# Patient Record
Sex: FEMALE | Race: BLACK OR AFRICAN AMERICAN | NOT HISPANIC OR LATINO | URBAN - METROPOLITAN AREA
[De-identification: names, ages, dates, MRNs, and addresses within clinical notes are randomized per-mention and may not be internally consistent; named-entity substitution may affect disease eponyms.]

---

## 2019-02-17 ENCOUNTER — EMERGENCY (EMERGENCY)
Facility: HOSPITAL | Age: 44
LOS: 1 days | Discharge: HOME | End: 2019-02-17
Attending: EMERGENCY MEDICINE
Payer: COMMERCIAL

## 2019-02-17 VITALS
DIASTOLIC BLOOD PRESSURE: 70 MMHG | OXYGEN SATURATION: 99 % | SYSTOLIC BLOOD PRESSURE: 118 MMHG | RESPIRATION RATE: 18 BRPM | TEMPERATURE: 99 F | HEART RATE: 70 BPM

## 2019-02-17 VITALS
OXYGEN SATURATION: 100 % | WEIGHT: 199.96 LBS | TEMPERATURE: 98 F | RESPIRATION RATE: 18 BRPM | HEART RATE: 53 BPM | HEIGHT: 64 IN | DIASTOLIC BLOOD PRESSURE: 73 MMHG | SYSTOLIC BLOOD PRESSURE: 116 MMHG

## 2019-02-17 DIAGNOSIS — R10.9 UNSPECIFIED ABDOMINAL PAIN: ICD-10-CM

## 2019-02-17 LAB
ALBUMIN SERPL ELPH-MCNC: 3.9 G/DL — SIGNIFICANT CHANGE UP (ref 3.3–5)
ALP SERPL-CCNC: 40 U/L — SIGNIFICANT CHANGE UP (ref 40–120)
ALT FLD-CCNC: 16 U/L — SIGNIFICANT CHANGE UP (ref 10–45)
ANION GAP SERPL CALC-SCNC: 12 MMOL/L — SIGNIFICANT CHANGE UP (ref 5–17)
APPEARANCE UR: CLEAR — SIGNIFICANT CHANGE UP
AST SERPL-CCNC: 14 U/L — SIGNIFICANT CHANGE UP (ref 10–40)
BASOPHILS # BLD AUTO: 0 K/UL — SIGNIFICANT CHANGE UP (ref 0–0.2)
BASOPHILS NFR BLD AUTO: 0.1 % — SIGNIFICANT CHANGE UP (ref 0–2)
BILIRUB SERPL-MCNC: 0.2 MG/DL — SIGNIFICANT CHANGE UP (ref 0.2–1.2)
BILIRUB UR-MCNC: NEGATIVE — SIGNIFICANT CHANGE UP
BUN SERPL-MCNC: 15 MG/DL — SIGNIFICANT CHANGE UP (ref 7–23)
CALCIUM SERPL-MCNC: 9.5 MG/DL — SIGNIFICANT CHANGE UP (ref 8.4–10.5)
CHLORIDE SERPL-SCNC: 105 MMOL/L — SIGNIFICANT CHANGE UP (ref 96–108)
CO2 SERPL-SCNC: 22 MMOL/L — SIGNIFICANT CHANGE UP (ref 22–31)
COLOR SPEC: SIGNIFICANT CHANGE UP
CREAT SERPL-MCNC: 1.03 MG/DL — SIGNIFICANT CHANGE UP (ref 0.5–1.3)
DIFF PNL FLD: NEGATIVE — SIGNIFICANT CHANGE UP
EOSINOPHIL # BLD AUTO: 0.2 K/UL — SIGNIFICANT CHANGE UP (ref 0–0.5)
EOSINOPHIL NFR BLD AUTO: 3.6 % — SIGNIFICANT CHANGE UP (ref 0–6)
GLUCOSE SERPL-MCNC: 109 MG/DL — HIGH (ref 70–99)
GLUCOSE UR QL: NEGATIVE — SIGNIFICANT CHANGE UP
HCT VFR BLD CALC: 36.5 % — SIGNIFICANT CHANGE UP (ref 34.5–45)
HGB BLD-MCNC: 13.1 G/DL — SIGNIFICANT CHANGE UP (ref 11.5–15.5)
KETONES UR-MCNC: NEGATIVE — SIGNIFICANT CHANGE UP
LEUKOCYTE ESTERASE UR-ACNC: NEGATIVE — SIGNIFICANT CHANGE UP
LIDOCAIN IGE QN: 34 U/L — SIGNIFICANT CHANGE UP (ref 7–60)
LYMPHOCYTES # BLD AUTO: 0.7 K/UL — LOW (ref 1–3.3)
LYMPHOCYTES # BLD AUTO: 14.2 % — SIGNIFICANT CHANGE UP (ref 13–44)
MCHC RBC-ENTMCNC: 30.3 PG — SIGNIFICANT CHANGE UP (ref 27–34)
MCHC RBC-ENTMCNC: 36 GM/DL — SIGNIFICANT CHANGE UP (ref 32–36)
MCV RBC AUTO: 84.3 FL — SIGNIFICANT CHANGE UP (ref 80–100)
MONOCYTES # BLD AUTO: 0.3 K/UL — SIGNIFICANT CHANGE UP (ref 0–0.9)
MONOCYTES NFR BLD AUTO: 6.8 % — SIGNIFICANT CHANGE UP (ref 2–14)
NEUTROPHILS # BLD AUTO: 3.9 K/UL — SIGNIFICANT CHANGE UP (ref 1.8–7.4)
NEUTROPHILS NFR BLD AUTO: 75.3 % — SIGNIFICANT CHANGE UP (ref 43–77)
NITRITE UR-MCNC: NEGATIVE — SIGNIFICANT CHANGE UP
PH UR: 5.5 — SIGNIFICANT CHANGE UP (ref 5–8)
PLATELET # BLD AUTO: 261 K/UL — SIGNIFICANT CHANGE UP (ref 150–400)
POTASSIUM SERPL-MCNC: 4.5 MMOL/L — SIGNIFICANT CHANGE UP (ref 3.5–5.3)
POTASSIUM SERPL-SCNC: 4.5 MMOL/L — SIGNIFICANT CHANGE UP (ref 3.5–5.3)
PROT SERPL-MCNC: 7.7 G/DL — SIGNIFICANT CHANGE UP (ref 6–8.3)
PROT UR-MCNC: NEGATIVE — SIGNIFICANT CHANGE UP
RBC # BLD: 4.33 M/UL — SIGNIFICANT CHANGE UP (ref 3.8–5.2)
RBC # FLD: 13.3 % — SIGNIFICANT CHANGE UP (ref 10.3–14.5)
SODIUM SERPL-SCNC: 139 MMOL/L — SIGNIFICANT CHANGE UP (ref 135–145)
SP GR SPEC: 1.01 — SIGNIFICANT CHANGE UP (ref 1.01–1.02)
UROBILINOGEN FLD QL: NEGATIVE — SIGNIFICANT CHANGE UP
WBC # BLD: 5.1 K/UL — SIGNIFICANT CHANGE UP (ref 3.8–10.5)
WBC # FLD AUTO: 5.1 K/UL — SIGNIFICANT CHANGE UP (ref 3.8–10.5)

## 2019-02-17 PROCEDURE — 80053 COMPREHEN METABOLIC PANEL: CPT

## 2019-02-17 PROCEDURE — 76830 TRANSVAGINAL US NON-OB: CPT

## 2019-02-17 PROCEDURE — 99284 EMERGENCY DEPT VISIT MOD MDM: CPT | Mod: 25

## 2019-02-17 PROCEDURE — 93975 VASCULAR STUDY: CPT | Mod: 26

## 2019-02-17 PROCEDURE — 76830 TRANSVAGINAL US NON-OB: CPT | Mod: 26

## 2019-02-17 PROCEDURE — 93971 EXTREMITY STUDY: CPT

## 2019-02-17 PROCEDURE — 83690 ASSAY OF LIPASE: CPT

## 2019-02-17 PROCEDURE — 93975 VASCULAR STUDY: CPT

## 2019-02-17 PROCEDURE — 85027 COMPLETE CBC AUTOMATED: CPT

## 2019-02-17 PROCEDURE — 93926 LOWER EXTREMITY STUDY: CPT

## 2019-02-17 PROCEDURE — 81003 URINALYSIS AUTO W/O SCOPE: CPT

## 2019-02-17 PROCEDURE — 93971 EXTREMITY STUDY: CPT | Mod: 26

## 2019-02-17 PROCEDURE — 87086 URINE CULTURE/COLONY COUNT: CPT

## 2019-02-17 RX ORDER — ACETAMINOPHEN 500 MG
650 TABLET ORAL ONCE
Qty: 0 | Refills: 0 | Status: COMPLETED | OUTPATIENT
Start: 2019-02-17 | End: 2019-02-17

## 2019-02-17 RX ADMIN — Medication 650 MILLIGRAM(S): at 12:06

## 2019-02-17 RX ADMIN — Medication 650 MILLIGRAM(S): at 10:12

## 2019-02-17 NOTE — CONSULT NOTE ADULT - ATTENDING COMMENTS
Pt seen and ultrasound discussed. Pt feeling better now after tylenol. No evidence of torsion. Pt advised to f/u with her outpatient gynecologist for a repeat ultrasound in two to three months.     MELANIE Cordova

## 2019-02-17 NOTE — ED PROVIDER NOTE - NS ED ROS FT
ROS:  GENERAL: No fever, no chills  EYES: no change in vision  HEENT: no trouble swallowing, no trouble speaking  CARDIAC: no chest pain  PULMONARY: no cough, no shortness of breath  GI: suprapubic abdominal discomfort, no nausea, no vomiting, no diarrhea, no constipation  : No dysuria, no frequency, no change in appearance, or odor of urine  SKIN: no rashes  NEURO: no headache, no weakness  MSK: RLE inner thigh pain  ~Jesus Stern D.O. -Resident

## 2019-02-17 NOTE — CONSULT NOTE ADULT - ASSESSMENT
43F with LMP 1/21/19 presented with RLQ pelvic pain now resolved, with fibroid uterus and physiologic ovarian cyst.

## 2019-02-17 NOTE — ED ADULT TRIAGE NOTE - CHIEF COMPLAINT QUOTE
sudden onset of lower abdominal cramping radiating to right leg associated with nausea, LMP Jan 21, started birth control pills Jan 28

## 2019-02-17 NOTE — ED PROVIDER NOTE - ATTENDING CONTRIBUTION TO CARE
attending Gissel: 43yF presents after episode of pelvic pain. Sharp R sided pain since resolved. Denies h/o ovarian cysts. No vaginal discharge or bleeding. No urinary symptoms. New OCP started last month. LMP 1/21. Also with transient episode of RLE pain. Will obtain labs including HCG, UA, TVUS, RLE duplex, pain control and reassess

## 2019-02-17 NOTE — ED PROVIDER NOTE - OBJECTIVE STATEMENT
43f no pmh pw pelvic pain since 2am this morning. patient reports having sharp pelvic pain radiating to the rle. patient denies having vaginal discharge, bleeding, dysuria. Reports having change in birth control last month but denies chest pain, shortness of breath, dizziness, palpitations, recent travel, hx of clots, smoking. lmp 1/21. no leg swelling rashes weakness in rle 43f no pmh pw pelvic pain since 2am this morning. patient reports having sharp pelvic pain radiating to the rle. patient denies having vaginal discharge, bleeding, dysuria. Reports having change in birth control last month but denies chest pain, shortness of breath, dizziness, palpitations, recent travel, hx of clots, smoking. lmp 1/21. no leg swelling rashes weakness in rle    obgyn- Dr. Jacques Moritz Holland

## 2019-02-17 NOTE — ED PROVIDER NOTE - PROGRESS NOTE DETAILS
attending Gissel: results reviewed with patient. No additional episodes of RLQ pain. Offered GYN consult in ED for possible torsion. pt unsure if she wants to stay- will discuss with her family and let ED team know. updated patient with results, would like a gyn consult eval for ovarian cyst attending Gissel: GYN at bedside attending Gissel: evaluated by OB. No pain in ED. Deemed stable for dc. Will dc with copy of results, instruction for close outpatient GYN followup. Strict return precautions discussed at length.

## 2019-02-17 NOTE — CONSULT NOTE ADULT - SUBJECTIVE AND OBJECTIVE BOX
HPI: 43F with LMP 19 presented with RLQ pelvic pain that started at 2am this morning. patient reports having sharp pelvic pain radiating to the right leg. pain is now resolved. pt took oral tylenol. patient denies having vaginal discharge, bleeding, dysuria. Reports having started a birth control last month with spironolactone as treatment for alopecia. She has a known ovarian cyst on the right - told it was small in past. She denies chest pain, shortness of breath, dizziness, palpitations, recent travel, hx of clots, smoking. no leg swelling rashes weakness in lower extremities.    obgyn- Dr. Jacques Moritz Cornel      PAST MEDICAL & SURGICAL HISTORY:  No pertinent past medical history  No significant past surgical history      REVIEW OF SYSTEMS  negative        Allergies    No Known Allergies    Intolerances            Vital Signs Last 24 Hrs  T(C): 36.4 (2019 07:14), Max: 36.4 (2019 07:14)  T(F): 97.6 (2019 07:14), Max: 97.6 (2019 07:14)  HR: 62 (2019 10:15) (53 - 62)  BP: 121/70 (2019 10:15) (116/73 - 122/68)  BP(mean): --  RR: 17 (2019 10:15) (17 - 18)  SpO2: 99% (2019 10:15) (99% - 100%)    PHYSICAL EXAM:      Constitutional: alert and oriented x 3  Abdomen: soft, non tender, + bowel sounds. No hepatosplenomegaly, no palpable masses      LABS:                        13.1   5.1   )-----------( 261      ( 2019 08:28 )             36.5     02-17    139  |  105  |  15  ----------------------------<  109<H>  4.5   |  22  |  1.03    Ca    9.5      2019 08:28    TPro  7.7  /  Alb  3.9  /  TBili  0.2  /  DBili  x   /  AST  14  /  ALT  16  /  AlkPhos  40  02-17      Urinalysis Basic - ( 2019 10:38 )    Color: Light Yellow / Appearance: Clear / S.013 / pH: x  Gluc: x / Ketone: Negative  / Bili: Negative / Urobili: Negative   Blood: x / Protein: Negative / Nitrite: Negative   Leuk Esterase: Negative / RBC: x / WBC x   Sq Epi: x / Non Sq Epi: x / Bacteria: x        RADIOLOGY & ADDITIONAL STUDIES:    < from: US Transvaginal (19 @ 09:59) >    EXAM:  US TRANSVAGINAL                          EXAM:  US DPLX PELVIC                          PROCEDURE DATE:  2019      INTERPRETATION:  CLINICAL INFORMATION: Pelvic pain for one day,   intermittent. Serum beta-hCG is not available at the time of this   dictation.    LMP: 2019.    COMPARISON: None available.    TECHNIQUE:     Endovaginal and transabdominal pelvic sonogram. Color and Spectral   Doppler was performed.    FINDINGS:    Uterus: 9.2 x 5.2 x 7.1 cm. Multiple uterine fibroids. A left body   intramural fibroid measures 2.2 x 2.0 x 1.9 cm. A submucosal fibroid   measures 2.4 x 2.2 x 2.2 cm.    Endometrium: 4 mm. Within normal limits.    Right ovary: 3.9 x 2.4 x 3.3 cm. Contains a 3.0 cm simple cyst. Normal   arterial and venous waveforms.    Left ovary: 3.1 x 1.3 x 1.8 cm. Within normal limits. Normal arterial and   venous waveforms.    Fluid: None.    IMPRESSION:    Uterine fibroids.     Right adnexal simple cyst.     No sonographic evidence of ovarian torsion.    IRMA BHATT M.D., RADIOLOGY RESIDENT  This document has been electronically signed.  JOSEPH MARTINEZ M.D., ATTENDING RADIOLOGIST  This document has been electronically signed. 2019 12:00PM      < end of copied text >      < from: [facility] Duplex Lower Ext Vein Scan, Right (19 @ 10:40) >    EXAM:  DUPLEX EXT VEINS LOWER RT                            PROCEDURE DATE:  2019      INTERPRETATION:  CLINICAL INFORMATION: Right lower extremity pain, along   inner thigh. Evaluate for DVT.    COMPARISON: None available.    TECHNIQUE: Duplex sonography of the RIGHT LOWER extremity with color and   spectral Doppler, with and without compression.      FINDINGS:    There is normal compressibility of the right common femoral, femoral and   popliteal veins. No calf vein thrombosis is detected.    The contralateral common femoral vein is patent.    Doppler examination shows normal spontaneous and phasic flow.    No sonographic abnormality in the area of clinical concern.    IMPRESSION:     No evidence of right lower extremity deep venous thrombosis.    No sonographic abnormality in the area of clinical concern.    IRMA BHATT M.D., RADIOLOGY RESIDENT  This document has been electronically signed.  JOSEPH MARTINEZ M.D., ATTENDING RADIOLOGIST  This document has been electronically signed. 2019 11:58AM      < end of copied text >

## 2019-02-17 NOTE — CONSULT NOTE ADULT - PROBLEM SELECTOR RECOMMENDATION 9
- patient clinically stable with no pain, very low concern for ovarian torsion with physiologic 3cm cyst and resolved pain.   - discussed with patient possible causes of pain - fibroids, cyst, GI causes, MSK, etc  - reviewed concerning symptoms and precautions to seek medical care including severe pain not relieved with medications, heavy vaginal bleeding, fever or other concerning symptoms.   - patient will follow up with private Gyn at [place] in the near future, 1-2 wks  seen with Dr Art Campbell MD R4

## 2019-02-17 NOTE — ED PROVIDER NOTE - NSFOLLOWUPINSTRUCTIONS_ED_ALL_ED_FT
Stay well hydrated.  Follow-up with your OBGYN this week.   Bring a copy of your results with you  Return to an ER for worsening symptoms or any other concerns.

## 2019-02-17 NOTE — ED ADULT NURSE NOTE - OBJECTIVE STATEMENT
Pt reports sudden onset of sharp , cramping like radiating bilateral lower pelvic pain, radiating down right leg. Reports generalized abd discomfort starting last night, causing interrupted sleep; worsening approximately 5 hours ago. Reports nausea with pain. Denies urinary complaints, abnormal bleeding, vomiting, diarrhea. Reports LMP 1/21/18. Placed on birth control end of January, has not received next period at this time. Denies vaginal discharge. Denies back pain. Pt reports pain has subsided at this time , but comes on on/off at times. Reports "I felt really warm and sweaty earlier when the pain was strong, and now Im cold." Pt resting, IV placed. Awaiting MD Consult.

## 2019-02-17 NOTE — ED PROVIDER NOTE - CLINICAL SUMMARY MEDICAL DECISION MAKING FREE TEXT BOX
43f pw pelvic pain, concern for ectopic preg vs ov path vs rle clot, will get tvus, rle doppler, upreg, ua for uti, analgesia, labs, reassess

## 2019-02-17 NOTE — ED ADULT NURSE NOTE - CHPI ED NUR SYMPTOMS NEG
no blood in stool/no hematuria/no burning urination/no abdominal distension/no vomiting/no diarrhea/no dysuria

## 2019-02-17 NOTE — ED PROVIDER NOTE - PHYSICAL EXAMINATION
Physical Exam:  Gen: NAD, AOx3, non-toxic appearing, able to ambulate without assistance  Head: NCAT  HEENT: EOMI, PEERLA, normal conjunctiva, tongue midline, oral mucosa moist  Lung: CTAB, no respiratory distress, no wheezes/rhonchi/rales B/L, speaking in full sentences  CV: RRR, no murmurs, rubs or gallops  Abd: soft, NTND, suprapubic abdominal discomfort no guarding, no rigidity, no CVA tenderness   MSK: no visible deformities, ROM normal in UE/LE, no back pain, RLE tender along inner thigh area, no ecchymosis rashes or edema  Neuro: No focal sensory or motor deficits  Skin: Warm, well perfused, no rash  Psych: normal affect, calm  ~Jesus Stern D.O. -Resident

## 2019-02-18 LAB
CULTURE RESULTS: NO GROWTH — SIGNIFICANT CHANGE UP
SPECIMEN SOURCE: SIGNIFICANT CHANGE UP

## 2019-02-19 PROBLEM — Z00.00 ENCOUNTER FOR PREVENTIVE HEALTH EXAMINATION: Status: ACTIVE | Noted: 2019-02-19

## 2020-07-01 ENCOUNTER — TRANSCRIPTION ENCOUNTER (OUTPATIENT)
Age: 45
End: 2020-07-01

## 2024-10-14 NOTE — ED ADULT NURSE REASSESSMENT NOTE - NS ED NURSE REASSESS COMMENT FT1
Pt remains off unit, awaiting patient's return.
Pt brought back from US, A+OX3, reports pain has been better. Pt given ordered Tylenol. Urine sent. Pending disposition. Comfort and safety rounding in place.
CAD (coronary artery disease)